# Patient Record
Sex: FEMALE
[De-identification: names, ages, dates, MRNs, and addresses within clinical notes are randomized per-mention and may not be internally consistent; named-entity substitution may affect disease eponyms.]

---

## 2020-02-17 ENCOUNTER — NURSE TRIAGE (OUTPATIENT)
Dept: OTHER | Facility: CLINIC | Age: 60
End: 2020-02-17

## 2020-02-17 NOTE — TELEPHONE ENCOUNTER
Reason for Disposition   Cough has been present for > 3 weeks    Protocols used: COUGH-ADULT-[place]    Pt calling c/o cough for about 3 weeks. Started with a cold a few weeks ago. Now her cough is still lingering. Cough is dry, at times will cough up mucous. Will get in to coughing spasms. No fever, no chest pain, no wheezing. Recommend pt see PCP within 3 days, warm fluids for coughing spasms, increase fluids, call back if any new or worsening symptoms.